# Patient Record
Sex: MALE | Race: WHITE
[De-identification: names, ages, dates, MRNs, and addresses within clinical notes are randomized per-mention and may not be internally consistent; named-entity substitution may affect disease eponyms.]

---

## 2017-06-22 ENCOUNTER — HOSPITAL ENCOUNTER (EMERGENCY)
Dept: HOSPITAL 79 - ER1 | Age: 72
Discharge: HOME | End: 2017-06-22
Payer: MEDICARE

## 2017-06-22 DIAGNOSIS — Z90.49: ICD-10-CM

## 2017-06-22 DIAGNOSIS — E11.42: Primary | ICD-10-CM

## 2017-06-22 LAB
BUN/CREATININE RATIO: 20 (ref 0–10)
HGB BLD-MCNC: 14.5 GM/DL (ref 14–17.5)
RED BLOOD COUNT: 4.73 M/UL (ref 4.2–5.5)
WHITE BLOOD COUNT: 8 K/UL (ref 4.5–11)

## 2021-12-28 ENCOUNTER — HOSPITAL ENCOUNTER (INPATIENT)
Dept: HOSPITAL 79 - ER1 | Age: 76
LOS: 7 days | Discharge: HOME | DRG: 432 | End: 2022-01-04
Attending: INTERNAL MEDICINE | Admitting: INTERNAL MEDICINE
Payer: MEDICARE

## 2021-12-28 VITALS — HEIGHT: 75 IN | BODY MASS INDEX: 20.14 KG/M2 | WEIGHT: 162 LBS

## 2021-12-28 DIAGNOSIS — K72.00: ICD-10-CM

## 2021-12-28 DIAGNOSIS — E11.40: ICD-10-CM

## 2021-12-28 DIAGNOSIS — R18.8: ICD-10-CM

## 2021-12-28 DIAGNOSIS — R00.1: ICD-10-CM

## 2021-12-28 DIAGNOSIS — K64.9: ICD-10-CM

## 2021-12-28 DIAGNOSIS — I44.0: ICD-10-CM

## 2021-12-28 DIAGNOSIS — Z80.0: ICD-10-CM

## 2021-12-28 DIAGNOSIS — N18.9: ICD-10-CM

## 2021-12-28 DIAGNOSIS — D50.0: ICD-10-CM

## 2021-12-28 DIAGNOSIS — I50.9: ICD-10-CM

## 2021-12-28 DIAGNOSIS — H53.8: ICD-10-CM

## 2021-12-28 DIAGNOSIS — Z91.81: ICD-10-CM

## 2021-12-28 DIAGNOSIS — N17.9: ICD-10-CM

## 2021-12-28 DIAGNOSIS — I08.1: ICD-10-CM

## 2021-12-28 DIAGNOSIS — Z79.4: ICD-10-CM

## 2021-12-28 DIAGNOSIS — K76.7: ICD-10-CM

## 2021-12-28 DIAGNOSIS — Z98.41: ICD-10-CM

## 2021-12-28 DIAGNOSIS — K74.60: Primary | ICD-10-CM

## 2021-12-28 DIAGNOSIS — F41.1: ICD-10-CM

## 2021-12-28 DIAGNOSIS — Z80.1: ICD-10-CM

## 2021-12-28 DIAGNOSIS — Z20.822: ICD-10-CM

## 2021-12-28 DIAGNOSIS — E11.22: ICD-10-CM

## 2021-12-28 DIAGNOSIS — G93.41: ICD-10-CM

## 2021-12-28 DIAGNOSIS — Z98.42: ICD-10-CM

## 2021-12-28 DIAGNOSIS — E88.09: ICD-10-CM

## 2021-12-28 DIAGNOSIS — N62: ICD-10-CM

## 2021-12-28 DIAGNOSIS — D63.1: ICD-10-CM

## 2021-12-28 DIAGNOSIS — N20.0: ICD-10-CM

## 2021-12-28 DIAGNOSIS — I13.0: ICD-10-CM

## 2021-12-28 LAB
BUN/CREATININE RATIO: 12 (ref 0–10)
HGB BLD-MCNC: 8.9 GM/DL (ref 14–17.5)
RED BLOOD COUNT: 3.28 M/UL (ref 4.2–5.5)
WHITE BLOOD COUNT: 6.7 K/UL (ref 4.5–11)

## 2021-12-28 PROCEDURE — P9016 RBC LEUKOCYTES REDUCED: HCPCS

## 2021-12-28 PROCEDURE — P9047 ALBUMIN (HUMAN), 25%, 50ML: HCPCS

## 2021-12-28 PROCEDURE — U0002 COVID-19 LAB TEST NON-CDC: HCPCS

## 2021-12-29 LAB
BODY FLUID SOURCE: (no result)
BUN/CREATININE RATIO: 12 (ref 0–10)
HGB BLD-MCNC: 7.9 GM/DL (ref 14–17.5)
MONONUCLEAR CELLS: 90 (ref 75–100)
POLYMORPHONUCLEAR %: 10 (ref 0–25)
RBC (AUTOMATED): 100 (ref 0–100000)
RED BLOOD COUNT: 2.98 M/UL (ref 4.2–5.5)
WBC (AUTOMATED): 127 (ref 0–500)
WHITE BLOOD COUNT: 5.1 K/UL (ref 4.5–11)

## 2021-12-29 PROCEDURE — B24BZZZ ULTRASONOGRAPHY OF HEART WITH AORTA: ICD-10-PCS | Performed by: INTERNAL MEDICINE

## 2021-12-29 PROCEDURE — BW40ZZZ ULTRASONOGRAPHY OF ABDOMEN: ICD-10-PCS | Performed by: INTERNAL MEDICINE

## 2021-12-29 PROCEDURE — 0W9G3ZZ DRAINAGE OF PERITONEAL CAVITY, PERCUTANEOUS APPROACH: ICD-10-PCS | Performed by: INTERNAL MEDICINE

## 2021-12-30 LAB
BUN/CREATININE RATIO: 10 (ref 0–10)
BUN/CREATININE RATIO: 11 (ref 0–10)
HEP C VIRUS AB: <0.1 (ref 0–0.9)
HGB BLD-MCNC: 6.9 GM/DL (ref 14–17.5)
MECA (METHICILLIN RESIST GENE: DETECTED
RED BLOOD COUNT: 2.54 M/UL (ref 4.2–5.5)
STAPHYLOCOCCUS: DETECTED
WHITE BLOOD COUNT: 4.2 K/UL (ref 4.5–11)

## 2021-12-30 PROCEDURE — 30233N1 TRANSFUSION OF NONAUTOLOGOUS RED BLOOD CELLS INTO PERIPHERAL VEIN, PERCUTANEOUS APPROACH: ICD-10-PCS | Performed by: INTERNAL MEDICINE

## 2021-12-30 NOTE — NUR
PATIENTS IV NOTED TO INFILTRATE WITH BLOOD INFUSING. MD MADE AWARE. ORDERS TO
ELEVATE AND APPLY WARM COMPRESS. NUEROVASCULAR CHECK IS INTACT AT THIS TIME.
NO DISCOLORATIN. PATIENT DENIES ANY PAIN AT SITE.

## 2021-12-30 NOTE — NUR
12/29/2021 @ 22:18 - Telemetry called this RN stating that patient heart rate
dropped to 20's-30's two times. RN was in patient room both times telemetry
called. Patient is stable, asymptomatic. MD Spencer notified, obtained order
for EKG stat, and to call him with result. MD contacted with results, no new
orders obtained. Will continue to monitor.

## 2021-12-31 LAB
BUN/CREATININE RATIO: 12 (ref 0–10)
HGB BLD-MCNC: 7.9 GM/DL (ref 14–17.5)
RED BLOOD COUNT: 2.99 M/UL (ref 4.2–5.5)
WHITE BLOOD COUNT: 5.8 K/UL (ref 4.5–11)

## 2022-01-01 LAB
BUN/CREATININE RATIO: 12 (ref 0–10)
HGB BLD-MCNC: 8.3 GM/DL (ref 14–17.5)
RED BLOOD COUNT: 3.07 M/UL (ref 4.2–5.5)
WHITE BLOOD COUNT: 5.1 K/UL (ref 4.5–11)

## 2022-01-02 LAB
BUN/CREATININE RATIO: 12 (ref 0–10)
HGB BLD-MCNC: 9.8 GM/DL (ref 14–17.5)
RED BLOOD COUNT: 3.65 M/UL (ref 4.2–5.5)
WHITE BLOOD COUNT: 5.8 K/UL (ref 4.5–11)

## 2022-01-03 LAB
BUN/CREATININE RATIO: 13 (ref 0–10)
HGB BLD-MCNC: 9.1 GM/DL (ref 14–17.5)
RED BLOOD COUNT: 3.4 M/UL (ref 4.2–5.5)
WHITE BLOOD COUNT: 5.7 K/UL (ref 4.5–11)

## 2022-01-04 LAB
BUN/CREATININE RATIO: 12 (ref 0–10)
CREATININE, URINE: 96.9 MG/DL
HGB BLD-MCNC: 9.1 GM/DL (ref 14–17.5)
MICROALB/CREAT RATIO: 150 (ref 0–29)
RED BLOOD COUNT: 3.36 M/UL (ref 4.2–5.5)
WHITE BLOOD COUNT: 5.3 K/UL (ref 4.5–11)

## 2022-01-06 ENCOUNTER — TRANSCRIBE ORDERS (OUTPATIENT)
Dept: ADMINISTRATIVE | Facility: HOSPITAL | Age: 77
End: 2022-01-06

## 2022-01-06 DIAGNOSIS — Z01.818 PRE-OPERATIVE CLEARANCE: Primary | ICD-10-CM
